# Patient Record
Sex: MALE | Race: WHITE | Employment: FULL TIME | URBAN - METROPOLITAN AREA
[De-identification: names, ages, dates, MRNs, and addresses within clinical notes are randomized per-mention and may not be internally consistent; named-entity substitution may affect disease eponyms.]

---

## 2018-09-09 ENCOUNTER — HOSPITAL ENCOUNTER (OUTPATIENT)
Age: 33
Discharge: HOME | End: 2018-09-09
Attending: FAMILY MEDICINE
Payer: COMMERCIAL

## 2018-09-09 VITALS
HEART RATE: 100 BPM | RESPIRATION RATE: 16 BRPM | OXYGEN SATURATION: 100 % | BODY MASS INDEX: 23.74 KG/M2 | DIASTOLIC BLOOD PRESSURE: 79 MMHG | WEIGHT: 185 LBS | HEIGHT: 74 IN | TEMPERATURE: 98 F | SYSTOLIC BLOOD PRESSURE: 115 MMHG

## 2018-09-09 DIAGNOSIS — T63.444A BEE STING REACTION, UNDETERMINED INTENT, INITIAL ENCOUNTER: Primary | ICD-10-CM

## 2018-09-09 PROCEDURE — 99203 OFFICE O/P NEW LOW 30 MIN: CPT

## 2018-09-09 PROCEDURE — 99202 OFFICE O/P NEW SF 15 MIN: CPT

## 2018-09-09 RX ORDER — CEFADROXIL 500 MG/1
500 CAPSULE ORAL 2 TIMES DAILY
Qty: 20 CAPSULE | Refills: 0 | Status: SHIPPED | OUTPATIENT
Start: 2018-09-09 | End: 2018-09-19

## 2018-09-09 NOTE — ED PROVIDER NOTES
Pt seen in [address]      Stated Complaint: Patient presents with:  Bite Sting,Insect (integumentary)      --------------   RN assessment:     Bee sting to left arm 2 d ago  --------------    CC:  Bee sting    HPI:  Abdul Iron odynophagia  Genitourinary:negative for decreased stream and nocturia  Behavioral/Psych: negative for aggressive behavior and hallucinations  10 point review of systems is otherwise negative.     Objective   Vitals Ranges and Last Value:  ED Triage Vitals [ ONLY  Therapeutic plan as noted  All questions answered, pt verbalizes understanding  F/u w/PCP advised    ----------------------------------------------     Clinical Impression:    Bee sting reaction, undetermined intent, initial encounter  (primary encou

## 2018-09-09 NOTE — ED INITIAL ASSESSMENT (HPI)
Patient states he was stung by a bee on Friday on his left arm. Patient states after he was stung by a bee, he noticed redness to left forearm, warmth, itchiness, some burning. Patient stats this is not his first time getting stung by a bee.   Patient den

## 2021-08-02 ENCOUNTER — HOSPITAL ENCOUNTER (OUTPATIENT)
Age: 36
Discharge: HOME | End: 2021-08-02
Payer: COMMERCIAL

## 2021-08-02 VITALS
RESPIRATION RATE: 16 BRPM | HEART RATE: 71 BPM | OXYGEN SATURATION: 98 % | BODY MASS INDEX: 23.74 KG/M2 | SYSTOLIC BLOOD PRESSURE: 132 MMHG | WEIGHT: 185 LBS | HEIGHT: 74 IN | TEMPERATURE: 97 F | DIASTOLIC BLOOD PRESSURE: 86 MMHG

## 2021-08-02 DIAGNOSIS — L02.91 ABSCESS: Primary | ICD-10-CM

## 2021-08-02 PROCEDURE — 99203 OFFICE O/P NEW LOW 30 MIN: CPT | Performed by: NURSE PRACTITIONER

## 2021-08-02 PROCEDURE — 10060 I&D ABSCESS SIMPLE/SINGLE: CPT | Performed by: NURSE PRACTITIONER

## 2021-08-02 RX ORDER — CEPHALEXIN 500 MG/1
500 CAPSULE ORAL 3 TIMES DAILY
Qty: 30 CAPSULE | Refills: 0 | Status: SHIPPED | OUTPATIENT
Start: 2021-08-02 | End: 2021-08-12

## 2021-08-02 NOTE — ED PROVIDER NOTES
Patient Seen in: [facility]    History   Patient presents with:  Bite Sting,Insect    Stated Complaint: Bug bite    HPI  Patient complains of skin infection for  2 days.    Located behind the left ear he reports that he may have gotten stung bite, cyst, abscess      ED Course   Labs Reviewed - No data to display    MDM         Patient presents with abscess. Differential diagnosis include:  abscess, cyst, cellulitis     Small amount of surrounding indurated tissue.   No fever, nontoxic, does 1 capsule (500 mg total) by mouth 3 (three) times daily for 10 days. , Normal, Disp-30 capsule, R-0

## 2021-08-02 NOTE — ED INITIAL ASSESSMENT (HPI)
Pt states that he thinks he has an insect sting behind his left ear that occurred 2 days ago. C/o dull pain to neck, denies any itching.